# Patient Record
Sex: FEMALE | Race: WHITE | NOT HISPANIC OR LATINO
[De-identification: names, ages, dates, MRNs, and addresses within clinical notes are randomized per-mention and may not be internally consistent; named-entity substitution may affect disease eponyms.]

---

## 2018-08-28 ENCOUNTER — TRANSCRIPTION ENCOUNTER (OUTPATIENT)
Age: 68
End: 2018-08-28

## 2018-08-29 ENCOUNTER — TRANSCRIPTION ENCOUNTER (OUTPATIENT)
Age: 68
End: 2018-08-29

## 2018-08-30 ENCOUNTER — OUTPATIENT (OUTPATIENT)
Dept: OUTPATIENT SERVICES | Facility: HOSPITAL | Age: 68
LOS: 1 days | End: 2018-08-30
Payer: COMMERCIAL

## 2018-08-30 VITALS
SYSTOLIC BLOOD PRESSURE: 128 MMHG | WEIGHT: 145.51 LBS | HEIGHT: 65 IN | OXYGEN SATURATION: 100 % | TEMPERATURE: 97 F | RESPIRATION RATE: 17 BRPM | HEART RATE: 83 BPM | DIASTOLIC BLOOD PRESSURE: 77 MMHG

## 2018-08-30 VITALS
HEART RATE: 73 BPM | DIASTOLIC BLOOD PRESSURE: 64 MMHG | OXYGEN SATURATION: 100 % | SYSTOLIC BLOOD PRESSURE: 132 MMHG | RESPIRATION RATE: 16 BRPM

## 2018-08-30 DIAGNOSIS — Z98.41 CATARACT EXTRACTION STATUS, RIGHT EYE: Chronic | ICD-10-CM

## 2018-08-30 DIAGNOSIS — H33.012 RETINAL DETACHMENT WITH SINGLE BREAK, LEFT EYE: ICD-10-CM

## 2018-08-30 DIAGNOSIS — Z98.890 OTHER SPECIFIED POSTPROCEDURAL STATES: Chronic | ICD-10-CM

## 2018-08-30 PROCEDURE — 93005 ELECTROCARDIOGRAM TRACING: CPT

## 2018-08-30 PROCEDURE — 67108 REPAIR DETACHED RETINA: CPT | Mod: LT

## 2018-08-30 PROCEDURE — 93010 ELECTROCARDIOGRAM REPORT: CPT

## 2018-08-30 PROCEDURE — C1814: CPT

## 2018-08-30 PROCEDURE — C1889: CPT

## 2018-08-30 NOTE — ASU PATIENT PROFILE, ADULT - VISION (WITH CORRECTIVE LENSES IF THE PATIENT USUALLY WEARS THEM):
limited peripheral vision in left eye/Partially impaired: cannot see medication labels or newsprint, but can see obstacles in path, and the surrounding layout; can count fingers at arm's length

## 2018-08-30 NOTE — ASU DISCHARGE PLAN (ADULT/PEDIATRIC). - NOTIFY
Bleeding that does not stop/Fever greater than 101/Pain not relieved by Medications/Inability to Tolerate Liquids or Foods/Persistent Nausea and Vomiting

## 2018-08-30 NOTE — ASU DISCHARGE PLAN (ADULT/PEDIATRIC). - PT EDUC
face down gas bubble bracelet applied.Gas bubble restrictions reviewed with pt other (specify)/face down gas bubble bracelet applied.Gas bubble restrictions reviewed with pt

## 2018-09-13 ENCOUNTER — OUTPATIENT (OUTPATIENT)
Dept: OUTPATIENT SERVICES | Facility: HOSPITAL | Age: 68
LOS: 1 days | End: 2018-09-13
Payer: COMMERCIAL

## 2018-09-13 VITALS
SYSTOLIC BLOOD PRESSURE: 133 MMHG | DIASTOLIC BLOOD PRESSURE: 66 MMHG | OXYGEN SATURATION: 99 % | HEART RATE: 67 BPM | RESPIRATION RATE: 14 BRPM

## 2018-09-13 VITALS
RESPIRATION RATE: 16 BRPM | OXYGEN SATURATION: 100 % | TEMPERATURE: 98 F | SYSTOLIC BLOOD PRESSURE: 139 MMHG | WEIGHT: 145.95 LBS | DIASTOLIC BLOOD PRESSURE: 82 MMHG | HEIGHT: 65 IN | HEART RATE: 82 BPM

## 2018-09-13 DIAGNOSIS — Z98.41 CATARACT EXTRACTION STATUS, RIGHT EYE: Chronic | ICD-10-CM

## 2018-09-13 DIAGNOSIS — H33.012 RETINAL DETACHMENT WITH SINGLE BREAK, LEFT EYE: ICD-10-CM

## 2018-09-13 DIAGNOSIS — Z98.890 OTHER SPECIFIED POSTPROCEDURAL STATES: Chronic | ICD-10-CM

## 2018-09-13 PROBLEM — D49.89 NEOPLASM OF UNSPECIFIED BEHAVIOR OF OTHER SPECIFIED SITES: Chronic | Status: ACTIVE | Noted: 2018-08-30

## 2018-09-13 PROBLEM — K58.9 IRRITABLE BOWEL SYNDROME WITHOUT DIARRHEA: Chronic | Status: ACTIVE | Noted: 2018-08-30

## 2018-09-13 PROCEDURE — C1889: CPT

## 2018-09-13 PROCEDURE — 67108 REPAIR DETACHED RETINA: CPT | Mod: LT

## 2018-09-13 PROCEDURE — C1784: CPT

## 2018-09-13 PROCEDURE — C1814: CPT

## 2018-09-13 NOTE — ASU PATIENT PROFILE, ADULT - VISION (WITH CORRECTIVE LENSES IF THE PATIENT USUALLY WEARS THEM):
limited peripheral vision in left eye/Partially impaired: cannot see medication labels or newsprint, but can see obstacles in path, and the surrounding layout; can count fingers at arm's length Partially impaired: cannot see medication labels or newsprint, but can see obstacles in path, and the surrounding layout; can count fingers at arm's length

## 2018-09-13 NOTE — ASU DISCHARGE PLAN (ADULT/PEDIATRIC). - NOTIFY
Bleeding that does not stop/Pain not relieved by Medications/Swelling that continues/Persistent Nausea and Vomiting/Fever greater than 101

## 2018-10-16 ENCOUNTER — TRANSCRIPTION ENCOUNTER (OUTPATIENT)
Age: 68
End: 2018-10-16

## 2018-10-16 NOTE — ASU PATIENT PROFILE, ADULT - PSH
Problem: Respiratory Impairment - Respiratory Therapy 253  Goal: Demonstrates optimal level of respiratory function 9472  Outcome: Outcome Met, Continue evaluating goal progress toward completion  Weaning ventilator as tolerated    Problem: Pain  Goal: #Acceptable pain/comfort level is achieved/maintained at rest (based on self report using Numeric Rating Scales/Faces  Outcome: Outcome Not Met, Continue to Monitor  Fentanyl gtt for pain    Problem: Cardiac Surgery  Goal: Hemodynamic stability achieved/maintained  Outcome: Outcome Not Met, Plan Adjusted  Continuing to wean Milrinone  Goal: Neurological status returns to baseline  Outcome: Outcome Not Met, Plan Adjusted  Remains on sedation.  When decreased, does follow commands and nod head yes/no appropriately  Goal: Elimination status is maintained/returns to baseline  Outcome: Outcome Not Met, Continue to Monitor  Continue with angel cathter    Problem: VTE, Risk for  Goal: # No s/s of VTE  Outcome: Outcome Met, Continue evaluating goal progress toward completion  Currently receiving treatment for multiple clots       H/O colonoscopy    History of bilateral cataract extraction    History of vitrectomy  left

## 2018-10-17 ENCOUNTER — OUTPATIENT (OUTPATIENT)
Dept: OUTPATIENT SERVICES | Facility: HOSPITAL | Age: 68
LOS: 1 days | Discharge: HOME | End: 2018-10-17
Payer: COMMERCIAL

## 2018-10-17 VITALS
RESPIRATION RATE: 19 BRPM | DIASTOLIC BLOOD PRESSURE: 64 MMHG | HEART RATE: 76 BPM | OXYGEN SATURATION: 100 % | SYSTOLIC BLOOD PRESSURE: 130 MMHG

## 2018-10-17 VITALS
WEIGHT: 147.71 LBS | RESPIRATION RATE: 15 BRPM | OXYGEN SATURATION: 100 % | HEIGHT: 65 IN | TEMPERATURE: 98 F | HEART RATE: 68 BPM | DIASTOLIC BLOOD PRESSURE: 78 MMHG | SYSTOLIC BLOOD PRESSURE: 151 MMHG

## 2018-10-17 DIAGNOSIS — H43.312 VITREOUS MEMBRANES AND STRANDS, LEFT EYE: ICD-10-CM

## 2018-10-17 DIAGNOSIS — Z98.890 OTHER SPECIFIED POSTPROCEDURAL STATES: Chronic | ICD-10-CM

## 2018-10-17 DIAGNOSIS — H33.012 RETINAL DETACHMENT WITH SINGLE BREAK, LEFT EYE: ICD-10-CM

## 2018-10-17 DIAGNOSIS — Z98.41 CATARACT EXTRACTION STATUS, RIGHT EYE: Chronic | ICD-10-CM

## 2018-10-17 PROCEDURE — 67113 REPAIR RETINAL DETACH CPLX: CPT | Mod: LT

## 2018-10-17 PROCEDURE — C1814: CPT

## 2018-10-17 PROCEDURE — C1889: CPT

## 2018-10-17 PROCEDURE — C1784: CPT

## 2018-10-17 NOTE — ASU DISCHARGE PLAN (ADULT/PEDIATRIC). - PROCEDURE
left eye pars plana vitrectomy, endolaser, injectiojn perfluoron, endocautery, injection of 5000 silicone oil

## 2019-01-24 ENCOUNTER — TRANSCRIPTION ENCOUNTER (OUTPATIENT)
Age: 69
End: 2019-01-24

## 2019-01-24 NOTE — ASU PATIENT PROFILE, ADULT - PATIENT'S HEIGHT AND WEIGHT RECORDED IN THE VITAL SIGNS FLOWSHEET
Problem: Patient Care Overview (Adult)  Goal: Plan of Care Review  Outcome: Ongoing (interventions implemented as appropriate)    10/20/17 2182   Coping/Psychosocial Response Interventions   Plan Of Care Reviewed With patient   Patient Care Overview   Progress improving   Outcome Evaluation   Outcome Summary/Follow up Plan Denies pain, meets criteria for discharge from PACU         Problem: Perioperative Period (Adult)  Goal: Signs and Symptoms of Listed Potential Problems Will be Absent or Manageable (Perioperative Period)  Outcome: Ongoing (interventions implemented as appropriate)      
Problem: Patient Care Overview (Adult)  Goal: Plan of Care Review  Outcome: Ongoing (interventions implemented as appropriate)    10/20/17 8500   Coping/Psychosocial Response Interventions   Plan Of Care Reviewed With patient   Patient Care Overview   Progress no change         Problem: Perioperative Period (Adult)  Goal: Signs and Symptoms of Listed Potential Problems Will be Absent or Manageable (Perioperative Period)  Outcome: Ongoing (interventions implemented as appropriate)      
Problem: Patient Care Overview (Adult)  Goal: Plan of Care Review  Outcome: Outcome(s) achieved Date Met:  10/20/17    10/20/17 1820   Coping/Psychosocial Response Interventions   Plan Of Care Reviewed With patient;friend   Patient Care Overview   Progress improving   Outcome Evaluation   Outcome Summary/Follow up Plan PT MEETS DISCHARGE CRITERIA         Problem: Perioperative Period (Adult)  Goal: Signs and Symptoms of Listed Potential Problems Will be Absent or Manageable (Perioperative Period)  Outcome: Outcome(s) achieved Date Met:  10/20/17      
yes

## 2019-01-25 ENCOUNTER — OUTPATIENT (OUTPATIENT)
Dept: OUTPATIENT SERVICES | Facility: HOSPITAL | Age: 69
LOS: 1 days | End: 2019-01-25
Payer: COMMERCIAL

## 2019-01-25 VITALS
DIASTOLIC BLOOD PRESSURE: 70 MMHG | HEIGHT: 65.5 IN | TEMPERATURE: 98 F | RESPIRATION RATE: 16 BRPM | SYSTOLIC BLOOD PRESSURE: 116 MMHG | OXYGEN SATURATION: 100 % | WEIGHT: 149.47 LBS | HEART RATE: 72 BPM

## 2019-01-25 VITALS
SYSTOLIC BLOOD PRESSURE: 111 MMHG | OXYGEN SATURATION: 99 % | RESPIRATION RATE: 17 BRPM | HEART RATE: 73 BPM | DIASTOLIC BLOOD PRESSURE: 71 MMHG

## 2019-01-25 DIAGNOSIS — T85.398A OTHER MECHANICAL COMPLICATION OF OTHER OCULAR PROSTHETIC DEVICES, IMPLANTS AND GRAFTS, INITIAL ENCOUNTER: ICD-10-CM

## 2019-01-25 DIAGNOSIS — Z98.41 CATARACT EXTRACTION STATUS, RIGHT EYE: Chronic | ICD-10-CM

## 2019-01-25 DIAGNOSIS — Z98.890 OTHER SPECIFIED POSTPROCEDURAL STATES: Chronic | ICD-10-CM

## 2019-01-25 PROCEDURE — 67121 REMOVE EYE IMPLANT MATERIAL: CPT | Mod: LT

## 2019-01-25 NOTE — ASU DISCHARGE PLAN (ADULT/PEDIATRIC). - SPECIAL INSTRUCTIONS
Tylenol as directed as needed for pain. Do not rub the operative eye   Resume regular medications as per your physician's instructions  May take Tylenol (Acetaminophen ) as needed for pain as directed  Bring all eye drops to the doctor's office for your follow-up visit

## 2020-03-05 NOTE — ASU PREOP CHECKLIST - AS BP NONINV METHOD
[General Appearance - Alert] : alert [General Appearance - In No Acute Distress] : in no acute distress [Sclera] : the sclera and conjunctiva were normal [PERRL With Normal Accommodation] : pupils were equal in size, round, and reactive to light [Extraocular Movements] : extraocular movements were intact [Outer Ear] : the ears and nose were normal in appearance [Oropharynx] : the oropharynx was normal [Neck Appearance] : the appearance of the neck was normal [Neck Cervical Mass (___cm)] : no neck mass was observed [Jugular Venous Distention Increased] : there was no jugular-venous distention [Thyroid Diffuse Enlargement] : the thyroid was not enlarged [Thyroid Nodule] : there were no palpable thyroid nodules [Auscultation Breath Sounds / Voice Sounds] : lungs were clear to auscultation bilaterally [Heart Rate And Rhythm] : heart rate was normal and rhythm regular [Heart Sounds] : normal S1 and S2 [Heart Sounds Gallop] : no gallops [Murmurs] : no murmurs [Heart Sounds Pericardial Friction Rub] : no pericardial rub [Full Pulse] : the pedal pulses are present [Edema] : there was no peripheral edema [Bowel Sounds] : normal bowel sounds [Abdomen Soft] : soft [Abdomen Tenderness] : non-tender [Abdomen Mass (___ Cm)] : no abdominal mass palpated [Cervical Lymph Nodes Enlarged Posterior Bilaterally] : posterior cervical [Cervical Lymph Nodes Enlarged Anterior Bilaterally] : anterior cervical [Supraclavicular Lymph Nodes Enlarged Bilaterally] : supraclavicular [No CVA Tenderness] : no ~M costovertebral angle tenderness [No Spinal Tenderness] : no spinal tenderness [Abnormal Walk] : normal gait [Nail Clubbing] : no clubbing  or cyanosis of the fingernails [Musculoskeletal - Swelling] : no joint swelling seen [Motor Tone] : muscle strength and tone were normal [Skin Color & Pigmentation] : normal skin color and pigmentation [Skin Turgor] : normal skin turgor [] : no rash [No Focal Deficits] : no focal deficits [Oriented To Time, Place, And Person] : oriented to person, place, and time electronic [Impaired Insight] : insight and judgment were intact [Affect] : the affect was normal

## 2021-06-21 NOTE — ASU DISCHARGE PLAN (ADULT/PEDIATRIC). - =======================================================================
Pharmacy/Patient is requesting a refill on pravastatin (PRAVACHOL) 80 MG tablet  Last seen: 6/10/2021  Last medicare wellness 3/25/2021  Next appointment scheduled:none  Last refill 3/22/2021  Component      Latest Ref Rng & Units 3/27/2021   Fasting Status      Hours 12   CHOLESTEROL      <=199 mg/dL 161   TRIGLYCERIDE      <=149 mg/dL 220 (H)   HDL      >=40 mg/dL 41   CALCULATED LDL      <=129 mg/dL 76   CALCULATED NON HDL      mg/dL 120   CHOL/HDL      <=4.4 3.9   RX sent to pharmacy as directed.   
Statement Selected

## 2021-09-13 PROBLEM — Z00.00 ENCOUNTER FOR PREVENTIVE HEALTH EXAMINATION: Status: ACTIVE | Noted: 2021-09-13

## 2021-09-14 ENCOUNTER — APPOINTMENT (OUTPATIENT)
Dept: PEDIATRIC ORTHOPEDIC SURGERY | Facility: CLINIC | Age: 71
End: 2021-09-14
Payer: COMMERCIAL

## 2021-09-14 ENCOUNTER — TRANSCRIPTION ENCOUNTER (OUTPATIENT)
Age: 71
End: 2021-09-14

## 2021-09-14 VITALS — BODY MASS INDEX: 22.82 KG/M2 | WEIGHT: 142 LBS | HEIGHT: 66 IN

## 2021-09-14 DIAGNOSIS — Z78.9 OTHER SPECIFIED HEALTH STATUS: ICD-10-CM

## 2021-09-14 DIAGNOSIS — Z80.9 FAMILY HISTORY OF MALIGNANT NEOPLASM, UNSPECIFIED: ICD-10-CM

## 2021-09-14 DIAGNOSIS — Z82.49 FAMILY HISTORY OF ISCHEMIC HEART DISEASE AND OTHER DISEASES OF THE CIRCULATORY SYSTEM: ICD-10-CM

## 2021-09-14 DIAGNOSIS — Z83.3 FAMILY HISTORY OF DIABETES MELLITUS: ICD-10-CM

## 2021-09-14 DIAGNOSIS — S92.515A NONDISPLACED FRACTURE OF PROXIMAL PHALANX OF LEFT LESSER TOE(S), INITIAL ENCOUNTER FOR CLOSED FRACTURE: ICD-10-CM

## 2021-09-14 DIAGNOSIS — Z82.61 FAMILY HISTORY OF ARTHRITIS: ICD-10-CM

## 2021-09-14 DIAGNOSIS — Z72.89 OTHER PROBLEMS RELATED TO LIFESTYLE: ICD-10-CM

## 2021-09-14 PROCEDURE — 99202 OFFICE O/P NEW SF 15 MIN: CPT

## 2021-09-17 PROBLEM — Z83.3 FAMILY HISTORY OF DIABETES MELLITUS: Status: ACTIVE | Noted: 2021-09-14

## 2021-09-17 PROBLEM — Z82.61 FAMILY HISTORY OF ARTHRITIS: Status: ACTIVE | Noted: 2021-09-14

## 2021-09-17 PROBLEM — Z78.9 NON-SMOKER: Status: ACTIVE | Noted: 2021-09-14

## 2021-09-17 PROBLEM — Z82.49 FAMILY HISTORY OF HYPERTENSION: Status: ACTIVE | Noted: 2021-09-14

## 2021-09-17 PROBLEM — Z72.89 CONSUMES ALCOHOL: Status: ACTIVE | Noted: 2021-09-14

## 2021-09-17 PROBLEM — Z82.49 FAMILY HISTORY OF CARDIAC DISORDER: Status: ACTIVE | Noted: 2021-09-14

## 2021-09-17 PROBLEM — S92.515A NONDISPLACED FRACTURE OF PROXIMAL PHALANX OF LEFT LESSER TOE(S), INITIAL ENCOUNTER FOR CLOSED FRACTURE: Status: ACTIVE | Noted: 2021-09-17

## 2021-09-17 PROBLEM — Z80.9 FAMILY HISTORY OF MALIGNANT NEOPLASM: Status: ACTIVE | Noted: 2021-09-14

## 2021-09-17 PROBLEM — Z78.9 DOES NOT USE ILLICIT DRUGS: Status: ACTIVE | Noted: 2021-09-14

## 2021-09-17 NOTE — PHYSICAL EXAM
[de-identified] : On physical examination she has swelling and some tenderness in the region of the proximal portion of the proximal phalanx.  There is no malalignment of the toes.  Neurovascular status of the left toes is intact.

## 2021-09-17 NOTE — ASSESSMENT
[FreeTextEntry1] : Nondisplaced fracture proximal phalanx left fourth toe\par \par I have advised the patient that she will have swelling and some tenderness for at least 6 more weeks.  She will continue taping her toes as necessary.  She will return on a as needed basis.\par \par Encounter time: 15 minutes

## 2021-09-17 NOTE — HISTORY OF PRESENT ILLNESS
[de-identified] : This 71-year-old female is here for evaluation of an injury sustained to the left fourth toe several days ago.  Because of persistent swelling she has come for orthopedic evaluation.  She did have an x-ray which revealed a fracture of the proximal phalanx nondisplaced.

## 2022-03-28 NOTE — ASU PATIENT PROFILE, ADULT - HEALTHCARE QUESTIONS, PROFILE
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

## 2022-12-08 ENCOUNTER — APPOINTMENT (OUTPATIENT)
Dept: PEDIATRIC ORTHOPEDIC SURGERY | Facility: CLINIC | Age: 72
End: 2022-12-08

## 2022-12-08 VITALS — HEIGHT: 66 IN | BODY MASS INDEX: 22.82 KG/M2 | WEIGHT: 142 LBS | TEMPERATURE: 97.1 F

## 2022-12-08 DIAGNOSIS — M75.21 BICIPITAL TENDINITIS, RIGHT SHOULDER: ICD-10-CM

## 2022-12-08 PROCEDURE — 99213 OFFICE O/P EST LOW 20 MIN: CPT

## 2022-12-08 PROCEDURE — 73030 X-RAY EXAM OF SHOULDER: CPT

## 2022-12-08 RX ORDER — DICLOFENAC SODIUM 50 MG/1
50 TABLET, DELAYED RELEASE ORAL TWICE DAILY
Qty: 60 | Refills: 2 | Status: ACTIVE | COMMUNITY
Start: 2022-12-08 | End: 1900-01-01

## 2022-12-08 NOTE — ASSESSMENT
[FreeTextEntry1] : Right shoulder tendinitis\par \par I advised intermittent use of diclofenac as well as physical therapy.  She has been made aware of the possibility of cortisone injection.

## 2022-12-08 NOTE — DATA REVIEWED
[de-identified] : X-ray evaluation of the right shoulder on 12/8/2022 (AP and lateral views) reveals very minimal degenerative changes.\par Indication for shoulder x-ray: To determine presence of arthritis or bone lesion

## 2022-12-08 NOTE — PHYSICAL EXAM
[FreeTextEntry1] : On physical examination there is mild crepitus on range of motion.  There is no AP instability and a negative sulcus sign.  There is tenderness in the anterior deltoid bursa and bicipital groove with pain radiating into the proximal biceps.  She can achieve a full range of motion.  Her rotator cuff strength is normal.

## 2022-12-08 NOTE — HISTORY OF PRESENT ILLNESS
[FreeTextEntry1] : This 72-year-old female is here for evaluation of a 6-month history of right shoulder pain.  She states that she relates it to doing weight training exercises during the past summer.  She has been taking diclofenac intermittently as well as doing physical therapy on 2 separate occasions.  Pain is not that significant at this time but she is concerned and she wants the shoulder evaluated.

## 2023-03-02 NOTE — ASU PATIENT PROFILE, ADULT - NS TRANSFER PATIENT BELONGINGS
Emergency Department Resident Note    Patient: Eduardo Brunson Jr Age: 68 year old Sex: male   MRN: [id number] : 1954 Encounter Date: 2023           Review of triage note, vitals, and recent note(s) if available shows:      Prior cerebrovascular disease, acute CVA hypertension left-sided weakness and history.  Patient presenting after unwitnessed mechanical fall.  Was trying to get her call light per patient when he fell out of wheelchair got stuck between the bed and dresser.  Complaining of head neck shoulder and knee pain.  Patient states he had a stroke 2 weeks ago.  With left-sided deficits.  Patient is on blood thinners.      HISTORY OF PRESENT ILLNESS:  This is a 68 year old male with history obtained from patient.  History of hypertension hyperlipidemia stroke several weeks ago presented after mechanical fall.  Patient was reaching up to attempt a reach call light when he fell forward slipping and hit his head on the side of the dresser.  Presenting with some head pain neck pain and right knee pain as well as hip pain.  No chest pain shortness of breath palpitations dizziness.      PHYSICAL EXAMINATION  ED Triage Vitals   ED Triage Vitals Group      Temp 23 1248 98.6 °F (37 °C)      Heart Rate 23 1248 (!) 50      Resp 23 1248 16      BP 23 1248 135/85      SpO2 23 1248 100 %      EtCO2 mmHg --       Height 230 5' 9\" (1.753 m)      Weight 23 2300 152 lb 8.9 oz (69.2 kg)      Weight Scale Used 23 230 Scale in bed      BMI (Calculated) 23 2300 22.53      IBW/kg (Calculated) 23 2300 70.7     General:   Head, eyes, ears, nose, throat: EOMI   Cardiovascular: Regular rate and rhythm   Pulmonary: No increased work of breathing, lungs are clear to auscultation bilaterally  Gastrointestinal: Abdomen is soft, nontender, nondistended  Genitourinary: No suprapubic tenderness  Musculoskeletal: Patient endorses tenderness to cervical spine,  lumbar spine.  As well as pain in his head.  As well as some tenderness to the hip, knees, tibia on the right.  No obvious bony abnormalities.  Skin: No obvious lesions  Neuro: Patient is spontaneously moving all limbs with no focal neurologic abnormalities      Clinical Impression/Medical Decision Making   Patient : Eduardo Brunson Jr Age: 68 year old Sex: male   MRN: [id number] Encounter Date: 3/2/2023    Callback: [phone]: [facility].        This is a 68 year old male presenting after mechanical fall.  Will obtain imaging of related areas.  Will obtain CT.  Of cervical spine.  No preceding symptoms.  Mechanical.               ED Medication Orders (From admission, onward)    None              PAST HISTORY         Past Medical History:   Diagnosis Date   • Essential (primary) hypertension    • High cholesterol    • Stroke (CMD)     No past surgical history on file.          Social History     Tobacco Use   • Smoking status: Former     Packs/day: 0.25     Types: Cigarettes   • Smokeless tobacco: Never   Vaping Use   • Vaping Use: never used   Substance Use Topics   • Alcohol use: Not Currently     Alcohol/week: 4.0 standard drinks     Types: 4 Cans of beer per week   • Drug use: Not Currently     Types: Marijuana    Family History   Family history unknown: Yes             Prior to Admission medications    Medication Sig Start Date End Date Taking? Authorizing Provider   levETIRAcetam (KepPRA) 500 MG tablet Take 1 tablet by mouth every 12 hours. 2/1/23 2/1/24 Yes René Hobson MD   sertraline (ZOLOFT) 25 MG tablet Take 1 tablet by mouth daily. 1/17/23  Yes Outside Provider   acetaminophen (TYLENOL) 325 MG tablet Take 2 tablets by mouth every 6 hours as needed for Pain. 12/30/22  Yes Marcelino Howard MD   tamsulosin (FLOMAX) 0.4 MG Cap Take 1 capsule by mouth daily after a meal. Do not start before December 31, 2022. 12/31/22 3/3/23 Yes Marcelino Howard MD   polyethylene glycol (MIRALAX) 17 g packet Take 17  g by mouth daily as needed (constipation). Stir and dissolve powder in any 4 to 8 ounces of beverage, then drink. 12/30/22  Yes Marcelino Howard MD   docusate sodium-sennosides (SENOKOT S) 50-8.6 MG per tablet Take 2 tablets by mouth daily. 12/30/22  Yes Marcelino Howard MD   metoPROLOL succinate (TOPROL-XL) 25 MG 24 hr tablet Take 1 tablet by mouth daily. Do not start before December 10, 2022. 12/10/22  Yes Ja Webber MD   pantoprazole (PROTONIX) 40 MG tablet Take 1 tablet by mouth daily. Do not start before December 10, 2022. 12/10/22  Yes Ja Webber MD   aspirin 81 MG chewable tablet Chew 1 tablet by mouth daily. Do not start before December 10, 2022. 12/10/22  Yes Ja Webber MD   atorvastatin (LIPITOR) 80 MG tablet Take 1 tablet by mouth nightly. 12/9/22  Yes Ja Webber MD   clopidogrel (PLAVIX) 75 MG tablet Take 1 tablet by mouth daily. Do not start before December 10, 2022. 12/10/22  Yes Ja Webber MD   enoxaparin (LOVENOX) 40 MG/0.4ML injectable solution Inject 40 mg into the skin daily.   Yes Outside Provider   hydrALAZINE (APRESOLINE) 50 MG tablet Take 50 mg by mouth in the morning and 50 mg in the evening. 11/6/22  Yes Outside Provider   simvastatin (ZOCOR) 20 MG tablet Take 20 mg by mouth every evening. 11/6/22 12/9/22  Outside Provider    No Known Allergies                Procedures  Labs: Reviewed in the patient's medical chart  Imaging:   CT LOWER EXTREMITY RIGHT  WO CONTRAST   Final Result      1.   No acute fracture or dislocation.    2.   Chronic lateral tibial plateau fracture extending into the lateral   tibial metaphysis with intermittent solid bony bridging and a persistent   fracture cleft. Chronic depressed fracture at the intercondylar eminence.      Electronically Signed by: DOMENICA HUYNH MD    Signed on: 3/2/2023 10:27 PM          XR TIBIA FIBULA 2 VIEWS RIGHT   Final Result   FINDINGS/IMPRESSION:   No acute fracture or dislocation. Joint spaces are maintained.  Soft tissues   are unremarkable.       Electronically Signed by: THIAGO VALENTINE MD    Signed on: 3/2/2023 9:30 PM          XR KNEE 3 VIEWS RIGHT   Final Result      Fracture of the right proximal tibia involving the lateral tibial plateau   which appears subacute or chronic in age. No definite acute fracture   identified.      Electronically Signed by: INDIO BRODERICK MD    Signed on: 3/2/2023 5:54 PM          XR HIP 2 VIEWS RIGHT AND PELVIS   Final Result      1. No acute osseous abnormality identified.                  Electronically Signed by: INDIO BRODERICK MD    Signed on: 3/2/2023 5:51 PM          CT HEAD WO CONTRAST   Final Result      HEAD:   1. No acute intracranial abnormality.   2. Interval evolution of a large chronic infarction in the right middle   cerebral artery territory.      SPINE:   1. No evidence of acute fracture in the cervical or lumbar spine.   2. Multilevel spondylosis with significant stenoses as above.      Electronically Signed by: INDIO BRODERICK MD    Signed on: 3/2/2023 4:06 PM          CT CERVICAL SPINE WO CONTRAST   Final Result      HEAD:   1. No acute intracranial abnormality.   2. Interval evolution of a large chronic infarction in the right middle   cerebral artery territory.      SPINE:   1. No evidence of acute fracture in the cervical or lumbar spine.   2. Multilevel spondylosis with significant stenoses as above.      Electronically Signed by: INDIO BRODERICK MD    Signed on: 3/2/2023 4:06 PM          CT LUMBAR SPINE WO CONTRAST   Final Result      HEAD:   1. No acute intracranial abnormality.   2. Interval evolution of a large chronic infarction in the right middle   cerebral artery territory.      SPINE:   1. No evidence of acute fracture in the cervical or lumbar spine.   2. Multilevel spondylosis with significant stenoses as above.      Electronically Signed by: INDIO BRODERICK MD    Signed on: 3/2/2023 4:06 PM                IMPRESSION AND PLAN  ED Diagnosis   1. Nonintractable  headache, unspecified chronicity pattern, unspecified headache type            DISPOSITION  Admit 3/2/2023  6:38 PM  Telemetry Bed?: No  Admitting Physician: NATHAN GOFF [[id number]]  Is this a telephone or verbal order?: This is a telephone order from the admitting physician  Transferring Patient to? Only adjust for transfers between [facility] ([facility] [facility] [[id number]]    I participated in the care of this patient and this note provides additional information during the visit.  Please refer to the attending note for further details regarding history, physical exam, work-up, medical decision making, and disposition.     This note was created using voice recognition technology and may include inadvertent transcriptional errors. Any such errors should be contextually interpreted.    Note to patient: The 21st Century Cures Act makes medical notes available to patients in the interest of transparency. Please be advised this note is a medical document. Medical documents are intended to carry relevant information, convey facts as evidence, and include the clinical opinion/interpretation of the practitioner. The medical note is intended as peer to peer communication and may appear blunt or direct. It is written in medical language and may contain abbreviations or verbiage that are unfamiliar.     MD June Malagon [id number]  Emergency Medicine, PGY2         Gab Kohler  Resident  03/03/23 1560     None

## 2023-03-23 ENCOUNTER — APPOINTMENT (OUTPATIENT)
Dept: PEDIATRIC ORTHOPEDIC SURGERY | Facility: CLINIC | Age: 73
End: 2023-03-23
Payer: COMMERCIAL

## 2023-03-23 VITALS
TEMPERATURE: 96.9 F | DIASTOLIC BLOOD PRESSURE: 75 MMHG | WEIGHT: 146 LBS | HEIGHT: 64 IN | SYSTOLIC BLOOD PRESSURE: 130 MMHG | BODY MASS INDEX: 24.92 KG/M2

## 2023-03-23 DIAGNOSIS — M17.12 UNILATERAL PRIMARY OSTEOARTHRITIS, LEFT KNEE: ICD-10-CM

## 2023-03-23 PROCEDURE — 99212 OFFICE O/P EST SF 10 MIN: CPT

## 2023-03-23 PROCEDURE — 73560 X-RAY EXAM OF KNEE 1 OR 2: CPT

## 2023-03-23 NOTE — PHYSICAL EXAM
[de-identified] : On physical examination patient's gait is normal.  She has a full range of motion of the left knee.  There is mild medial joint line tenderness.  There is no effusion and no varus valgus or AP instability. [de-identified] : X-ray evaluation of the left knee on 3/23/2023 (AP and lateral views) reveals medial compartment arthritis of a mild to moderate nature.

## 2023-03-23 NOTE — HISTORY OF PRESENT ILLNESS
[de-identified] : This 72-year-old female is here for a 3-month history of left medial knee pain without history of injury.  Patient does not give a history of swelling, locking or giving way.

## 2023-03-23 NOTE — ASSESSMENT
[FreeTextEntry1] : DJD left knee\par \par Because this patient is not having significant symptomatology she does not want to do any active treatment at this time.  She will return on a as needed basis.

## 2025-09-24 PROBLEM — G56.01 CARPAL TUNNEL SYNDROME OF RIGHT WRIST: Status: ACTIVE | Noted: 2025-09-24

## 2025-09-24 PROBLEM — M19.011 PRIMARY OSTEOARTHRITIS OF RIGHT SHOULDER: Status: ACTIVE | Noted: 2025-09-24
